# Patient Record
Sex: MALE | Race: WHITE | Employment: OTHER | URBAN - METROPOLITAN AREA
[De-identification: names, ages, dates, MRNs, and addresses within clinical notes are randomized per-mention and may not be internally consistent; named-entity substitution may affect disease eponyms.]

---

## 2017-03-28 ENCOUNTER — OFFICE VISIT (OUTPATIENT)
Dept: FAMILY MEDICINE CLINIC | Facility: CLINIC | Age: 37
End: 2017-03-28

## 2017-03-28 VITALS
RESPIRATION RATE: 18 BRPM | HEIGHT: 72 IN | WEIGHT: 196 LBS | DIASTOLIC BLOOD PRESSURE: 73 MMHG | HEART RATE: 72 BPM | SYSTOLIC BLOOD PRESSURE: 126 MMHG | BODY MASS INDEX: 26.55 KG/M2 | TEMPERATURE: 98 F

## 2017-03-28 DIAGNOSIS — Z00.00 ROUTINE PHYSICAL EXAMINATION: ICD-10-CM

## 2017-03-28 PROCEDURE — 99395 PREV VISIT EST AGE 18-39: CPT | Performed by: FAMILY MEDICINE

## 2017-03-28 NOTE — PROGRESS NOTES
HPI:    Patient ID: Nani Byrnes is a 39year old male. HPI Comments: Patient is here for routine physical exam. No acute issues. Patient is requesting blood testing. Diet and exercise have been good.  Past medical history, family history, and social histo Screening tests were discussed, and after discussion, will check lab work as below. Healthy diet, exercise, and weight were discussed. To call if problems and follow up and further management after testing. Routine follow up.  Pt will have his lab work sent

## 2017-03-30 ENCOUNTER — HOSPITAL ENCOUNTER (OUTPATIENT)
Dept: GENERAL RADIOLOGY | Age: 37
Discharge: HOME | End: 2017-03-30
Attending: NEUROLOGICAL SURGERY
Payer: COMMERCIAL

## 2017-03-30 ENCOUNTER — HOSPITAL ENCOUNTER (OUTPATIENT)
Dept: MRI IMAGING | Age: 37
Discharge: HOME | End: 2017-03-30
Attending: NEUROLOGICAL SURGERY
Payer: COMMERCIAL

## 2017-03-30 DIAGNOSIS — M47.22 CERVICAL SPONDYLOSIS WITH RADICULOPATHY: ICD-10-CM

## 2017-03-30 PROCEDURE — 72050 X-RAY EXAM NECK SPINE 4/5VWS: CPT

## 2017-03-30 PROCEDURE — 72141 MRI NECK SPINE W/O DYE: CPT

## 2017-04-04 ENCOUNTER — LAB ENCOUNTER (OUTPATIENT)
Dept: LAB | Age: 37
End: 2017-04-04
Attending: FAMILY MEDICINE
Payer: COMMERCIAL

## 2017-04-04 DIAGNOSIS — Z00.00 ROUTINE PHYSICAL EXAMINATION: ICD-10-CM

## 2017-04-04 PROCEDURE — 80061 LIPID PANEL: CPT

## 2017-04-04 PROCEDURE — 85025 COMPLETE CBC W/AUTO DIFF WBC: CPT

## 2017-04-04 PROCEDURE — 36415 COLL VENOUS BLD VENIPUNCTURE: CPT

## 2017-04-04 PROCEDURE — 80053 COMPREHEN METABOLIC PANEL: CPT

## 2017-06-13 ENCOUNTER — TELEPHONE (OUTPATIENT)
Dept: FAMILY MEDICINE CLINIC | Facility: CLINIC | Age: 37
End: 2017-06-13

## 2017-06-13 NOTE — TELEPHONE ENCOUNTER
Actions Requested: Advised ER for immediate diagnostic testing. Spouse agrees and will try to convince pt to go to [address] [place] [address] ER now.  Staff to f/u  Situation/Background   Problem: Sharp lower back pain   Onset: weeks ago   Associated Symptoms: Spouse calling for back pain and age > 61

## 2017-06-14 ENCOUNTER — OFFICE VISIT (OUTPATIENT)
Dept: FAMILY MEDICINE CLINIC | Facility: CLINIC | Age: 37
End: 2017-06-14

## 2017-06-14 VITALS
HEIGHT: 72 IN | RESPIRATION RATE: 18 BRPM | BODY MASS INDEX: 27.09 KG/M2 | WEIGHT: 200 LBS | TEMPERATURE: 98 F | HEART RATE: 68 BPM | DIASTOLIC BLOOD PRESSURE: 77 MMHG | SYSTOLIC BLOOD PRESSURE: 121 MMHG

## 2017-06-14 DIAGNOSIS — N20.0 KIDNEY STONE: ICD-10-CM

## 2017-06-14 PROCEDURE — 99212 OFFICE O/P EST SF 10 MIN: CPT | Performed by: FAMILY MEDICINE

## 2017-06-14 PROCEDURE — 99213 OFFICE O/P EST LOW 20 MIN: CPT | Performed by: FAMILY MEDICINE

## 2017-06-14 NOTE — PROGRESS NOTES
HPI:    Patient ID: Iris Blevins is a 39year old male. HPI Comments: Pt presents with a hx of kidney stones. Pt passed a kidney stone yesterday. Pt has no sig bleeding or pains now. Pt has had a lot of coffee recently. Pt denies any dysuria or fevers.

## 2017-11-13 ENCOUNTER — APPOINTMENT (OUTPATIENT)
Dept: CT IMAGING | Facility: HOSPITAL | Age: 37
End: 2017-11-13
Attending: EMERGENCY MEDICINE
Payer: COMMERCIAL

## 2017-11-13 ENCOUNTER — APPOINTMENT (OUTPATIENT)
Dept: GENERAL RADIOLOGY | Facility: HOSPITAL | Age: 37
End: 2017-11-13
Attending: UROLOGY
Payer: COMMERCIAL

## 2017-11-13 ENCOUNTER — HOSPITAL ENCOUNTER (OUTPATIENT)
Facility: HOSPITAL | Age: 37
Setting detail: OBSERVATION
Discharge: HOME | End: 2017-11-15
Attending: EMERGENCY MEDICINE | Admitting: HOSPITALIST
Payer: COMMERCIAL

## 2017-11-13 DIAGNOSIS — N20.0 KIDNEY STONE: Primary | ICD-10-CM

## 2017-11-13 PROCEDURE — 99220 INITIAL OBSERVATION CARE,LEVL III: CPT | Performed by: HOSPITALIST

## 2017-11-13 PROCEDURE — 99213 OFFICE O/P EST LOW 20 MIN: CPT | Performed by: UROLOGY

## 2017-11-13 PROCEDURE — 74000 XR ABDOMEN (1 VIEW) (CPT=74000): CPT | Performed by: UROLOGY

## 2017-11-13 PROCEDURE — 74176 CT ABD & PELVIS W/O CONTRAST: CPT | Performed by: EMERGENCY MEDICINE

## 2017-11-13 RX ORDER — HYDROMORPHONE HYDROCHLORIDE 1 MG/ML
1 INJECTION, SOLUTION INTRAMUSCULAR; INTRAVENOUS; SUBCUTANEOUS
Status: DISCONTINUED | OUTPATIENT
Start: 2017-11-13 | End: 2017-11-15

## 2017-11-13 RX ORDER — ONDANSETRON 2 MG/ML
4 INJECTION INTRAMUSCULAR; INTRAVENOUS ONCE
Status: COMPLETED | OUTPATIENT
Start: 2017-11-13 | End: 2017-11-13

## 2017-11-13 RX ORDER — DOCUSATE SODIUM 100 MG/1
100 CAPSULE, LIQUID FILLED ORAL 2 TIMES DAILY
Status: DISCONTINUED | OUTPATIENT
Start: 2017-11-13 | End: 2017-11-15

## 2017-11-13 RX ORDER — HYDROCODONE BITARTRATE AND ACETAMINOPHEN 10; 325 MG/1; MG/1
2 TABLET ORAL EVERY 6 HOURS PRN
Status: DISCONTINUED | OUTPATIENT
Start: 2017-11-13 | End: 2017-11-15

## 2017-11-13 RX ORDER — ALFUZOSIN HYDROCHLORIDE 10 MG/1
10 TABLET, EXTENDED RELEASE ORAL
Status: DISCONTINUED | OUTPATIENT
Start: 2017-11-14 | End: 2017-11-15

## 2017-11-13 RX ORDER — HYDROCODONE BITARTRATE AND ACETAMINOPHEN 5; 325 MG/1; MG/1
1 TABLET ORAL EVERY 4 HOURS PRN
Status: DISCONTINUED | OUTPATIENT
Start: 2017-11-13 | End: 2017-11-13

## 2017-11-13 RX ORDER — BISACODYL 10 MG
10 SUPPOSITORY, RECTAL RECTAL
Status: DISCONTINUED | OUTPATIENT
Start: 2017-11-13 | End: 2017-11-15

## 2017-11-13 RX ORDER — SODIUM CHLORIDE 0.9 % (FLUSH) 0.9 %
3 SYRINGE (ML) INJECTION AS NEEDED
Status: DISCONTINUED | OUTPATIENT
Start: 2017-11-13 | End: 2017-11-15

## 2017-11-13 RX ORDER — ONDANSETRON 2 MG/ML
4 INJECTION INTRAMUSCULAR; INTRAVENOUS EVERY 4 HOURS PRN
Status: DISCONTINUED | OUTPATIENT
Start: 2017-11-13 | End: 2017-11-15

## 2017-11-13 RX ORDER — HYDROCODONE BITARTRATE AND ACETAMINOPHEN 5; 325 MG/1; MG/1
1-2 TABLET ORAL EVERY 4 HOURS PRN
Qty: 10 TABLET | Refills: 0 | Status: SHIPPED | OUTPATIENT
Start: 2017-11-13 | End: 2017-11-14

## 2017-11-13 RX ORDER — HYDROCODONE BITARTRATE AND ACETAMINOPHEN 5; 325 MG/1; MG/1
2 TABLET ORAL EVERY 4 HOURS PRN
Status: DISCONTINUED | OUTPATIENT
Start: 2017-11-13 | End: 2017-11-15

## 2017-11-13 RX ORDER — ALFUZOSIN HYDROCHLORIDE 10 MG/1
10 TABLET, EXTENDED RELEASE ORAL ONCE
Status: COMPLETED | OUTPATIENT
Start: 2017-11-13 | End: 2017-11-13

## 2017-11-13 RX ORDER — MORPHINE SULFATE 4 MG/ML
4 INJECTION, SOLUTION INTRAMUSCULAR; INTRAVENOUS ONCE
Status: COMPLETED | OUTPATIENT
Start: 2017-11-13 | End: 2017-11-13

## 2017-11-13 RX ORDER — ACETAMINOPHEN 325 MG/1
650 TABLET ORAL EVERY 6 HOURS PRN
Status: DISCONTINUED | OUTPATIENT
Start: 2017-11-13 | End: 2017-11-15

## 2017-11-13 RX ORDER — SODIUM CHLORIDE 9 MG/ML
INJECTION, SOLUTION INTRAVENOUS CONTINUOUS
Status: DISCONTINUED | OUTPATIENT
Start: 2017-11-13 | End: 2017-11-14

## 2017-11-13 RX ORDER — ACETAMINOPHEN 325 MG/1
650 TABLET ORAL EVERY 4 HOURS PRN
Status: DISCONTINUED | OUTPATIENT
Start: 2017-11-13 | End: 2017-11-15

## 2017-11-13 RX ORDER — HYDROMORPHONE HYDROCHLORIDE 1 MG/ML
0.5 INJECTION, SOLUTION INTRAMUSCULAR; INTRAVENOUS; SUBCUTANEOUS EVERY 4 HOURS PRN
Status: DISCONTINUED | OUTPATIENT
Start: 2017-11-13 | End: 2017-11-15

## 2017-11-13 RX ORDER — KETOROLAC TROMETHAMINE 15 MG/ML
15 INJECTION, SOLUTION INTRAMUSCULAR; INTRAVENOUS ONCE
Status: COMPLETED | OUTPATIENT
Start: 2017-11-13 | End: 2017-11-13

## 2017-11-13 RX ORDER — MORPHINE SULFATE 4 MG/ML
INJECTION, SOLUTION INTRAMUSCULAR; INTRAVENOUS
Status: COMPLETED
Start: 2017-11-13 | End: 2017-11-13

## 2017-11-13 RX ORDER — ORPHENADRINE CITRATE 30 MG/ML
60 INJECTION INTRAMUSCULAR; INTRAVENOUS ONCE
Status: COMPLETED | OUTPATIENT
Start: 2017-11-13 | End: 2017-11-13

## 2017-11-13 RX ORDER — POLYETHYLENE GLYCOL 3350 17 G/17G
17 POWDER, FOR SOLUTION ORAL DAILY PRN
Status: DISCONTINUED | OUTPATIENT
Start: 2017-11-13 | End: 2017-11-15

## 2017-11-13 RX ORDER — HYDROMORPHONE HYDROCHLORIDE 1 MG/ML
0.5 INJECTION, SOLUTION INTRAMUSCULAR; INTRAVENOUS; SUBCUTANEOUS ONCE
Status: COMPLETED | OUTPATIENT
Start: 2017-11-13 | End: 2017-11-13

## 2017-11-13 RX ORDER — DIAZEPAM 5 MG/ML
2.5 INJECTION, SOLUTION INTRAMUSCULAR; INTRAVENOUS ONCE
Status: DISCONTINUED | OUTPATIENT
Start: 2017-11-13 | End: 2017-11-13

## 2017-11-13 NOTE — ED PROVIDER NOTES
Patient Seen in: [facility] AND [facility] Emergency Department    History   Patient presents with:  Abdomen/Flank Pain (GI/)    Stated Complaint:     HPI    Patient is a 79-year-old male with history of kidney stones who presents with left-sided flank pain st cyanosis/clubbing/edema  Neuro: CN intact, normal speech, normal gait, 5/5 motor strength in all extremities, no focal deficits  SKIN: warm, dry, no rashes      ED Course     Labs Reviewed   BASIC METABOLIC PANEL (8) - Abnormal; Notable for the following: f/u urine results and reassess patient after morphine. Disposition and Plan     Clinical Impression:  Kidney stone  (primary encounter diagnosis)    Disposition:  There is no disposition on file for this visit.   There is no disposition time on file

## 2017-11-13 NOTE — ED PROVIDER NOTES
The patient was signed out to me by Dr. Haydee Marie to follow-up on his urinalysis and reevaluate after pain medication. The patient does have a 4–5 mm proximal left ureteral stone, no signs of urine infection or elevated creatinine on labs.   Unfortunately

## 2017-11-13 NOTE — CONSULTS
[facility] HOSP - [facility]    Report of Consultation    Andres Lafleur Patient Status:  Observation    11/3/1980 MRN [id number]   Location [address] [facility] Attending Royer Bowman MD   Hosp Day # 0 PCP Beverly Flores MD     Date of Admission:   prostate cancer   • Other [OTHER] Other    • Dementia Paternal Grandmother      Alzheimers       Social History  Patient Guardian Status:  Not on file.     Other Topics            Concern  Caffeine Concern        Yes    Comment:Coffee, Soda    Social Histor normal. No rashes or lesions    Results:     Laboratory Data:    Lab Results  Component Value Date   WBC 9.5 11/13/2017   HGB 16.8 11/13/2017   HCT 49.9 11/13/2017    11/13/2017   CREATSERUM 1.32 11/13/2017   BUN 11 11/13/2017    11/13/2017 we can control his pain overnight with oral Norco and Dilaudid for breakthrough pain. I will reassess the patient in the morning to see how he feels and see what he is decided.   I spent well over 50 minutes with patient more than half the time in face to

## 2017-11-14 PROCEDURE — 99212 OFFICE O/P EST SF 10 MIN: CPT | Performed by: UROLOGY

## 2017-11-14 PROCEDURE — 99226 SUBSEQUENT OBSERVATION CARE: CPT | Performed by: HOSPITALIST

## 2017-11-14 RX ORDER — IBUPROFEN 400 MG/1
400 TABLET ORAL EVERY 8 HOURS PRN
Status: DISCONTINUED | OUTPATIENT
Start: 2017-11-14 | End: 2017-11-15

## 2017-11-14 RX ORDER — HYDROCODONE BITARTRATE AND ACETAMINOPHEN 10; 325 MG/1; MG/1
1 TABLET ORAL EVERY 4 HOURS PRN
Qty: 20 TABLET | Refills: 0 | Status: SHIPPED | OUTPATIENT
Start: 2017-11-14 | End: 2019-10-08

## 2017-11-14 RX ORDER — SULFAMETHOXAZOLE AND TRIMETHOPRIM 800; 160 MG/1; MG/1
1 TABLET ORAL EVERY 12 HOURS SCHEDULED
Status: DISCONTINUED | OUTPATIENT
Start: 2017-11-14 | End: 2017-11-15

## 2017-11-14 RX ORDER — MAGNESIUM SULFATE HEPTAHYDRATE 40 MG/ML
2 INJECTION, SOLUTION INTRAVENOUS ONCE
Status: COMPLETED | OUTPATIENT
Start: 2017-11-14 | End: 2017-11-14

## 2017-11-14 RX ORDER — ONDANSETRON 4 MG/1
4 TABLET, ORALLY DISINTEGRATING ORAL EVERY 4 HOURS PRN
Status: DISCONTINUED | OUTPATIENT
Start: 2017-11-14 | End: 2017-11-15

## 2017-11-14 RX ORDER — ECHINACEA PURPUREA EXTRACT 125 MG
1 TABLET ORAL EVERY 4 HOURS PRN
Status: DISCONTINUED | OUTPATIENT
Start: 2017-11-14 | End: 2017-11-15

## 2017-11-14 RX ORDER — ONDANSETRON 4 MG/1
4 TABLET, ORALLY DISINTEGRATING ORAL EVERY 4 HOURS PRN
Qty: 20 TABLET | Refills: 0 | Status: SHIPPED | OUTPATIENT
Start: 2017-11-14 | End: 2019-10-08

## 2017-11-14 RX ORDER — ALFUZOSIN HYDROCHLORIDE 10 MG/1
10 TABLET, EXTENDED RELEASE ORAL
Qty: 14 TABLET | Refills: 0 | Status: SHIPPED | OUTPATIENT
Start: 2017-11-15 | End: 2019-10-08

## 2017-11-14 NOTE — PROGRESS NOTES
Patient wife given prescriptions for tentative discharge. However, after urology came to see patient wants to keep one more night.  RN did inform patient and wife that if they want they can  the prescriptions today and will keep in nurse  for p

## 2017-11-14 NOTE — PAYOR COMM NOTE
OBSV STATUS    --------------  ADMISSION REVIEW     Payor: Andrew Elisa MASON/KAREN  Subscriber #:  [id number]  Authorization Number: N/A    Admit date: N/A  Admit time: N/A       Admitting Physician: Diana Oliveira MD  Attending Physician:  Jose Martin Owens MD All other systems reviewed and negative except as noted above.     Physical Exam[AM.1]   ED Triage Vitals [11/13/17 0455]  BP: (!) 168/91  Pulse: 79  Resp: 24  Temp: 97.8 °F (36.6 °C)  Temp src: Oral  SpO2: 98 %  O2 Device: None (Room air)[AM.2]    Curr 0615  ------------------------------------------------------------[AM.2]       [facility][AM.1]   CT abdomen and pelvis noncontrast      IMPRESSION:  3.5 mm calculus left upper ureter  Minimal proximal left hydroureter and hydronephrosis  No renal pelvis calculus Signed    :  Shelly Schroeder MD (Physician)       The patient was signed out to me by Dr. Momo Morrison to follow-up on his urinalysis and reevaluate after pain medication.   The patient does have a 4–5 mm proximal left ureteral stone, no signs of urine i represent renal failure.   -Repeat BMP in the morning  -IV fluids    DVT prophylaxis with SCDs  CODE STATUS is full code  Disposition pending    DATE OF ADMISSION: 11/13/17    CHIEF COMPLAINT: Flank pain    HISTORY OF PRESENT ILLNESS  This is a 44year oldm History   Problem Relation Age of Onset   • Dementia Maternal Grandfather      Alzheimers   • Prostate Cancer Maternal Grandfather    • Cancer Maternal Grandfather      prostate cancer   • Prostate Cancer Other      Family h/o prostate cancer   • Other [OT edema  Psych: Normal mood and affect.  Behavior and judgment normal.     Data:  Recent Labs   Lab  11/13/17   0502   RBC  5.72   HGB  16.8   HCT  49.9   MCV  87.2   MCH  29.4   MCHC  33.7   RDW  13.7   WBC  9.5   PLT  237     Recent Labs   Lab  11/13/17   0 chloride 40 mEq in sodium chloride 0.9 % 250 mL IVPB     Date Action Dose Route User    11/13/2017 4796 New Bag 40 mEq Intravenous Lon Ramírez RN      potassium chloride 40 mEq in sodium chloride 0.9 % 250 mL IVPB     Date Action Dose Route User

## 2017-11-14 NOTE — PROGRESS NOTES
[facility]  Hospitalist Progress  Note     Iris Felicity Patient Status:  Observation      44year old CSN [id number]   Location 536/536-A Attending Bryant Hensley MD   Hosp Day # 0 PCP Cammie Street MD     ASSESSMENT/PLAN    Radford Primrose CREATSERUM  1.32  1.50   GFRAA  >60  >60   GFRNAA  >60  52*   CA  8.8  8.1*   NA  138  135*   K  3.4  3.5  3.5   CL  102  104   CO2  28  26     No results for input(s): PT, INR, PTT in the last 72 hours.     • Alfuzosin HCl ER  10 mg Oral Daily with break

## 2017-11-14 NOTE — PLAN OF CARE
Problem: Patient/Family Goals  Goal: Patient/Family Long Term Goal  Patient's Long Term Goal: Resolution of kidney stone    Interventions:  - Urology on consult  - Straining all urine  - IVF  - See additional Care Plan goals for specific interventions    O nausea.   - Provide timely, complete, and accurate information to patient/family  - Incorporate patient and family knowledge, values, beliefs, and cultural backgrounds into the planning and delivery of care  - Encourage patient/family to participate in care

## 2017-11-14 NOTE — PLAN OF CARE
Problem: Patient/Family Goals  Goal: Patient/Family Long Term Goal  Patient's Long Term Goal: Resolution of kidney stone    Interventions:  - Urology on consult  - Straining all urine  - IVF  - See additional Care Plan goals for specific interventions   Ou care  Interventions:  - What would you like us to know as we care for you?  - Provide timely, complete, and accurate information to patient/family  - Incorporate patient and family knowledge, values, beliefs, and cultural backgrounds into the planning and

## 2017-11-15 ENCOUNTER — TELEPHONE (OUTPATIENT)
Dept: SURGERY | Facility: CLINIC | Age: 37
End: 2017-11-15

## 2017-11-15 VITALS
SYSTOLIC BLOOD PRESSURE: 153 MMHG | BODY MASS INDEX: 27.79 KG/M2 | HEIGHT: 72 IN | WEIGHT: 205.19 LBS | RESPIRATION RATE: 18 BRPM | OXYGEN SATURATION: 95 % | DIASTOLIC BLOOD PRESSURE: 88 MMHG | HEART RATE: 72 BPM | TEMPERATURE: 98 F

## 2017-11-15 PROCEDURE — 99212 OFFICE O/P EST SF 10 MIN: CPT | Performed by: UROLOGY

## 2017-11-15 PROCEDURE — 99217 OBSERVATION CARE DISCHARGE: CPT | Performed by: HOSPITALIST

## 2017-11-15 RX ORDER — SULFAMETHOXAZOLE AND TRIMETHOPRIM 800; 160 MG/1; MG/1
1 TABLET ORAL 2 TIMES DAILY
Qty: 14 TABLET | Refills: 0 | Status: SHIPPED | OUTPATIENT
Start: 2017-11-15 | End: 2017-11-22

## 2017-11-15 NOTE — TELEPHONE ENCOUNTER
Spoke with patient scheduled for L ESWL at /[place] on Wednesday 11/22/17 @ 7:30. Went over pre/op with patient and informed that I will mail out.

## 2017-11-15 NOTE — PLAN OF CARE
Problem: Patient/Family Goals  Goal: Patient/Family Long Term Goal  Patient's Long Term Goal: Resolution of kidney stone    Interventions:  - Urology on consult  - Straining all urine  - IVF  - See additional Care Plan goals for specific interventions    O to know as we care for you?   - Provide timely, complete, and accurate information to patient/family  - Incorporate patient and family knowledge, values, beliefs, and cultural backgrounds into the planning and delivery of care  - Encourage patient/family t

## 2017-11-15 NOTE — TELEPHONE ENCOUNTER
nAil Combs,  This pt was sent home today from the hospital.  I would like to schedule him for :    Diagnosis: Left ureteral stone  Procedure: ESWL with option for ureteroscopy  Site:[facility]  Timing: Next week.   -Antibiotics on call: Ciprofloxacin  mg IV o

## 2017-11-15 NOTE — DISCHARGE SUMMARY
[facility] HOSPITALIST  DISCHARGE SUMMARY     Negro Fam Patient Status:  Observation    11/3/1980 MRN [id number]   Location [facility] 5SW/SE Attending Ravindra Richardson MD   Hosp Day # 0 PCP Spencer Goodpasture, MD     Date of Admission: 2017 HYDROcodone-acetaminophen  MG Tabs  Commonly known as:  NORCO      Take 1 tablet by mouth every 4 (four) hours as needed.    Quantity:  20 tablet  Refills:  0     ondansetron 4 MG Tbdp  Commonly known as:  ZOFRAN-ODT      Take 1 tablet (4 mg total) edema.  -----------------------------------------------------------------------------------------------  PATIENT DISCHARGE INSTRUCTIONS: See electronic chart  Hospital Discharge Diagnoses: renal colic    TCM Diagnosis at discharge from Hospital: Other: dorene

## 2017-11-15 NOTE — PROGRESS NOTES
[facility]    Progress Note    Wilman Paul Patient Status:  Observation    11/3/1980 MRN [id number]   Location [facility] 5SW/SE Attending Marquise Santamaria MD   Hosp Day # 0 PCP Ayan Shaw MD       Subjective:   Wilman Paul is a(n

## 2017-11-15 NOTE — PROGRESS NOTES
[facility]    Progress Note    Saultyron Douglas Patient Status:  Observation    11/3/1980 MRN [id number]   Location [facility] 5SW/SE Attending Juhi Barnes MD   Hosp Day # 0 PCP Rowan Dixon MD       Subjective:   Lori Douglas is a(n perinephric and proximal periureteric fat stranding, which may relate to obstructive uropathy, but correlation with urinalysis is recommended to exclude superimposed infection of the obstructed collecting system. 3. No additional nephroureterolithiasis.

## 2017-11-16 ENCOUNTER — TELEPHONE (OUTPATIENT)
Dept: SURGERY | Facility: CLINIC | Age: 37
End: 2017-11-16

## 2017-11-16 ENCOUNTER — TELEPHONE (OUTPATIENT)
Dept: INTERNAL MEDICINE UNIT | Facility: HOSPITAL | Age: 37
End: 2017-11-16

## 2017-11-16 ENCOUNTER — HOSPITAL ENCOUNTER (EMERGENCY)
Facility: HOSPITAL | Age: 37
Discharge: HOME | End: 2017-11-16
Attending: EMERGENCY MEDICINE
Payer: COMMERCIAL

## 2017-11-16 VITALS
DIASTOLIC BLOOD PRESSURE: 85 MMHG | SYSTOLIC BLOOD PRESSURE: 135 MMHG | WEIGHT: 150 LBS | TEMPERATURE: 98 F | BODY MASS INDEX: 20.32 KG/M2 | HEIGHT: 72 IN | HEART RATE: 62 BPM | RESPIRATION RATE: 18 BRPM | OXYGEN SATURATION: 100 %

## 2017-11-16 DIAGNOSIS — K12.2 UVULITIS: Primary | ICD-10-CM

## 2017-11-16 PROCEDURE — 99283 EMERGENCY DEPT VISIT LOW MDM: CPT

## 2017-11-16 RX ORDER — AZITHROMYCIN 250 MG/1
TABLET, FILM COATED ORAL
Qty: 1 PACKAGE | Refills: 0 | Status: SHIPPED | OUTPATIENT
Start: 2017-11-16 | End: 2017-11-21

## 2017-11-16 RX ORDER — DEXAMETHASONE SODIUM PHOSPHATE 4 MG/ML
10 VIAL (ML) INJECTION ONCE
Status: COMPLETED | OUTPATIENT
Start: 2017-11-16 | End: 2017-11-16

## 2017-11-16 RX ORDER — PREDNISONE 20 MG/1
40 TABLET ORAL DAILY
Qty: 8 TABLET | Refills: 0 | Status: SHIPPED | OUTPATIENT
Start: 2017-11-16 | End: 2017-11-20

## 2017-11-16 NOTE — ED NOTES
STARTED TAKING BACTRIM YESTERDAY 2 DOSES FOR A KIDNEY STONE AND LAST NIGHT WOKE UP WITH SWOLLEN THROAT. + EDEMA, REDNESS, NO ACUTE SOB

## 2017-11-16 NOTE — TELEPHONE ENCOUNTER
pt called, throat swelling, feels like allergic reaction,   Was seen in [facility] ED on Monday 11/13/17 for kidney stones.

## 2017-11-16 NOTE — ED INITIAL ASSESSMENT (HPI)
Pt c/o throat swelling and itchiness since last night. Pt is concerned that he is having an allergic rxn to bactrim that he was given here for kidney stone and uti.

## 2017-11-16 NOTE — TELEPHONE ENCOUNTER
See below. Was asked by Chayito Patton to speak with pt's wife. Spoke with wife. She states pt's throat is really swollen, and she looked inside, and \"  it is so swollen, it looks like it is closing. \" Asked if he is having difficulty breathing.  She states \" a

## 2017-11-17 ENCOUNTER — TELEPHONE (OUTPATIENT)
Dept: SURGERY | Facility: CLINIC | Age: 37
End: 2017-11-17

## 2017-11-17 NOTE — TELEPHONE ENCOUNTER
Pt is passing stone, pain is at 7 - he took norco a couple of hours ago - asking if he can take another dose now

## 2017-11-17 NOTE — TELEPHONE ENCOUNTER
I spoke with pt's spouse Chayito Hwang and determined that pt took a Norco 10/325mg ( prescribed as 1 tab every 4 hrs),  about 2 hrs ago and he was doubled over with persistent severe pain and had no relief from 1 tab so she said that she just gave him another ta

## 2017-11-17 NOTE — ED PROVIDER NOTES
Patient Seen in: [facility] AND [facility] Emergency Department    History   Patient presents with: Allergic Rxn Allergies (immune)    Stated Complaint: throat swelling    HPI    Patient here with sore throat and swelling in uvula for 1 days.  On bactrim for uri Quit date: 11/13/2000  Smokeless tobacco: Never Used                      Alcohol use: No                Review of Systems    Positive for stated complaint: throat swelling  Other systems are as noted in HPI. Constitutional and vital signs reviewed.

## 2017-11-22 ENCOUNTER — APPOINTMENT (OUTPATIENT)
Dept: LAB | Facility: HOSPITAL | Age: 37
End: 2017-11-22
Attending: UROLOGY
Payer: COMMERCIAL

## 2017-11-22 ENCOUNTER — TELEPHONE (OUTPATIENT)
Dept: SURGERY | Facility: CLINIC | Age: 37
End: 2017-11-22

## 2017-11-22 DIAGNOSIS — N20.0 KIDNEY STONES: Primary | ICD-10-CM

## 2017-11-22 PROCEDURE — 88300 SURGICAL PATH GROSS: CPT

## 2017-11-22 PROCEDURE — 82365 CALCULUS SPECTROSCOPY: CPT | Performed by: UROLOGY

## 2017-11-22 PROCEDURE — 88300 SURGICAL PATH GROSS: CPT | Performed by: UROLOGY

## 2019-10-07 ENCOUNTER — NURSE TRIAGE (OUTPATIENT)
Dept: FAMILY MEDICINE CLINIC | Facility: CLINIC | Age: 39
End: 2019-10-07

## 2019-10-07 NOTE — TELEPHONE ENCOUNTER
Wife states for the past couple of weeks patient has had high blood pressure readings. This morning blood pressure was 148/96      Transferring to triage.

## 2019-10-07 NOTE — TELEPHONE ENCOUNTER
Reason for Disposition  • Patient wants to be seen    Protocols used: HIGH BLOOD PRESSURE-A-OH    Action Requested: Summary for Provider     []  Critical Lab, Recommendations Needed  [] Need Additional Advice  [x]   FYI    []   Need Orders  [] Need Medic

## 2019-10-08 ENCOUNTER — OFFICE VISIT (OUTPATIENT)
Dept: FAMILY MEDICINE CLINIC | Facility: CLINIC | Age: 39
End: 2019-10-08
Payer: COMMERCIAL

## 2019-10-08 VITALS
RESPIRATION RATE: 18 BRPM | SYSTOLIC BLOOD PRESSURE: 137 MMHG | BODY MASS INDEX: 26.41 KG/M2 | WEIGHT: 195 LBS | DIASTOLIC BLOOD PRESSURE: 81 MMHG | HEART RATE: 74 BPM | HEIGHT: 72 IN | TEMPERATURE: 98 F

## 2019-10-08 DIAGNOSIS — R03.0 ELEVATED BLOOD PRESSURE READING: ICD-10-CM

## 2019-10-08 PROCEDURE — 99213 OFFICE O/P EST LOW 20 MIN: CPT | Performed by: FAMILY MEDICINE

## 2019-10-08 RX ORDER — LISINOPRIL 10 MG/1
10 TABLET ORAL DAILY
Qty: 30 TABLET | Refills: 2 | Status: SHIPPED | OUTPATIENT
Start: 2019-10-08 | End: 2020-06-03

## 2019-10-08 NOTE — PROGRESS NOTES
HPI:    Patient ID: America Youssef is a 43year old male. Pt is here to discuss his elevated blood pressure over the last several months. Pt has been monitoring his own blood pressure at home and has had some readings over 140/90 at times.   Pt is on treatme Discussed good diet/activity; Follow up as needed or in 1 month if medication is started. No orders of the defined types were placed in this encounter.       Meds This Visit:  Requested Prescriptions     Signed Prescriptions Disp Refills   • lisinopr

## 2020-06-03 ENCOUNTER — OFFICE VISIT (OUTPATIENT)
Dept: FAMILY MEDICINE CLINIC | Facility: CLINIC | Age: 40
End: 2020-06-03
Payer: COMMERCIAL

## 2020-06-03 VITALS
WEIGHT: 198 LBS | BODY MASS INDEX: 26.82 KG/M2 | RESPIRATION RATE: 16 BRPM | HEIGHT: 72 IN | DIASTOLIC BLOOD PRESSURE: 88 MMHG | TEMPERATURE: 96 F | SYSTOLIC BLOOD PRESSURE: 140 MMHG | HEART RATE: 71 BPM

## 2020-06-03 DIAGNOSIS — I10 ESSENTIAL HYPERTENSION: ICD-10-CM

## 2020-06-03 DIAGNOSIS — G47.30 SLEEP APNEA, UNSPECIFIED TYPE: ICD-10-CM

## 2020-06-03 PROCEDURE — 99213 OFFICE O/P EST LOW 20 MIN: CPT | Performed by: FAMILY MEDICINE

## 2020-06-03 RX ORDER — LISINOPRIL 10 MG/1
10 TABLET ORAL DAILY
Qty: 30 TABLET | Refills: 5 | Status: SHIPPED | OUTPATIENT
Start: 2020-06-03 | End: 2021-02-08

## 2020-06-03 NOTE — PROGRESS NOTES
HPI:    Patient ID: Rina Guerrero is a 35year old male. Pt presents with some recent apnea and snoring issues as per wife. Pt has hx of hypertension- borderline. Has not been taking his mediation. No chest pains or sig headaches.  Pt does exercise regular

## 2020-09-29 ENCOUNTER — IMMUNIZATION (OUTPATIENT)
Dept: PEDIATRICS CLINIC | Facility: CLINIC | Age: 40
End: 2020-09-29
Payer: COMMERCIAL

## 2020-09-29 DIAGNOSIS — Z23 NEED FOR VACCINATION: ICD-10-CM

## 2020-09-29 PROCEDURE — 90471 IMMUNIZATION ADMIN: CPT | Performed by: PEDIATRICS

## 2020-09-29 PROCEDURE — 90686 IIV4 VACC NO PRSV 0.5 ML IM: CPT | Performed by: PEDIATRICS

## 2021-01-12 ENCOUNTER — OFFICE VISIT (OUTPATIENT)
Dept: SURGERY | Facility: CLINIC | Age: 41
End: 2021-01-12
Payer: COMMERCIAL

## 2021-01-12 VITALS
HEIGHT: 72 IN | DIASTOLIC BLOOD PRESSURE: 74 MMHG | RESPIRATION RATE: 16 BRPM | SYSTOLIC BLOOD PRESSURE: 140 MMHG | BODY MASS INDEX: 26.01 KG/M2 | WEIGHT: 192 LBS | HEART RATE: 82 BPM

## 2021-01-12 DIAGNOSIS — N40.1 BENIGN PROSTATIC HYPERPLASIA WITH LOWER URINARY TRACT SYMPTOMS, SYMPTOM DETAILS UNSPECIFIED: Primary | ICD-10-CM

## 2021-01-12 PROCEDURE — 99244 OFF/OP CNSLTJ NEW/EST MOD 40: CPT | Performed by: UROLOGY

## 2021-01-12 PROCEDURE — 3077F SYST BP >= 140 MM HG: CPT | Performed by: UROLOGY

## 2021-01-12 PROCEDURE — 3008F BODY MASS INDEX DOCD: CPT | Performed by: UROLOGY

## 2021-01-12 PROCEDURE — 3078F DIAST BP <80 MM HG: CPT | Performed by: UROLOGY

## 2021-01-12 RX ORDER — FINASTERIDE 5 MG/1
5 TABLET, FILM COATED ORAL DAILY
COMMUNITY
End: 2021-01-12 | Stop reason: CLARIF

## 2021-01-12 NOTE — PROGRESS NOTES
SUBJECTIVE:  Massiel Ventura is a 38year old male who presents for a consultation at the request of, and a copy of this note will be sent to, Dr. Nathalie Lindo, for evaluation of  benign prostatic hyperplasia. He states that the problem is unchanged.  Symptoms Seborrheic dermatitis       Past Surgical History:   Procedure Laterality Date   • EYE SURGERY     • LASIK  2012   • Mortimer Danes AUGMENT  2015   • REPAIR OF NASAL SEPTUM  2008   • SKIN SURGERY  07/18/14    R inguinal spindle cell lipoma   • SKIN SURGERY tenderness, masses, organomegaly or guarding  GENITOURINARY:      Penis: no penile lesions or discharge. Meatus normal location and size.       Scrotum: normal in appearance      Right Epididymis and Vas: both are palpably normal.      Right Testis: normal

## 2021-02-08 RX ORDER — LISINOPRIL 10 MG/1
TABLET ORAL
Qty: 30 TABLET | Refills: 5 | Status: SHIPPED | OUTPATIENT
Start: 2021-02-08 | End: 2021-08-26

## 2021-02-08 NOTE — TELEPHONE ENCOUNTER
Message noted: Chart reviewed and may refill medication as requested times one with 5 additional refills. Prescription sent to listed pharmacy. Pharmacy to notify patient.  Pt notified through [facility]

## 2021-08-26 RX ORDER — LISINOPRIL 10 MG/1
10 TABLET ORAL DAILY
Qty: 90 TABLET | Refills: 0 | Status: SHIPPED | OUTPATIENT
Start: 2021-08-26

## 2021-08-26 NOTE — TELEPHONE ENCOUNTER
Message noted: Chart reviewed and may refill medication as requested times one. Prescription sent to listed pharmacy. Pharmacy to notify patient to make appointment for further refills  Pt notified through [facility] also.

## 2021-08-26 NOTE — TELEPHONE ENCOUNTER
Please Review.  Protocol failed / No protocol     Requested Prescriptions   Pending Prescriptions Disp Refills    LISINOPRIL 10 MG Oral Tab [Pharmacy Med Name: LISINOPRIL 10MG TABLETS] 30 tablet 5     Sig: TAKE 1 TABLET(10 MG) BY MOUTH DAILY        Hyperten

## 2022-09-29 ENCOUNTER — NURSE TRIAGE (OUTPATIENT)
Dept: FAMILY MEDICINE CLINIC | Facility: CLINIC | Age: 42
End: 2022-09-29

## 2023-12-13 ENCOUNTER — HOSPITAL ENCOUNTER (OUTPATIENT)
Dept: GENERAL RADIOLOGY | Age: 43
Discharge: HOME | End: 2023-12-13
Attending: NURSE PRACTITIONER
Payer: COMMERCIAL

## 2023-12-13 ENCOUNTER — OFFICE VISIT (OUTPATIENT)
Dept: FAMILY MEDICINE CLINIC | Facility: CLINIC | Age: 43
End: 2023-12-13
Payer: COMMERCIAL

## 2023-12-13 VITALS
SYSTOLIC BLOOD PRESSURE: 134 MMHG | OXYGEN SATURATION: 97 % | HEIGHT: 72 IN | TEMPERATURE: 98 F | WEIGHT: 200 LBS | RESPIRATION RATE: 18 BRPM | BODY MASS INDEX: 27.09 KG/M2 | HEART RATE: 70 BPM | DIASTOLIC BLOOD PRESSURE: 80 MMHG

## 2023-12-13 DIAGNOSIS — R05.9 COUGH, UNSPECIFIED TYPE: ICD-10-CM

## 2023-12-13 DIAGNOSIS — J40 BRONCHITIS: Primary | ICD-10-CM

## 2023-12-13 DIAGNOSIS — J40 BRONCHITIS: ICD-10-CM

## 2023-12-13 PROCEDURE — 71046 X-RAY EXAM CHEST 2 VIEWS: CPT | Performed by: NURSE PRACTITIONER

## 2023-12-13 PROCEDURE — 99204 OFFICE O/P NEW MOD 45 MIN: CPT | Performed by: NURSE PRACTITIONER

## 2023-12-13 PROCEDURE — 3079F DIAST BP 80-89 MM HG: CPT | Performed by: NURSE PRACTITIONER

## 2023-12-13 PROCEDURE — 3075F SYST BP GE 130 - 139MM HG: CPT | Performed by: NURSE PRACTITIONER

## 2023-12-13 PROCEDURE — 3008F BODY MASS INDEX DOCD: CPT | Performed by: NURSE PRACTITIONER

## 2023-12-13 PROCEDURE — 87637 SARSCOV2&INF A&B&RSV AMP PRB: CPT | Performed by: NURSE PRACTITIONER

## 2023-12-13 RX ORDER — BENZONATATE 200 MG/1
200 CAPSULE ORAL 3 TIMES DAILY PRN
Qty: 15 CAPSULE | Refills: 0 | Status: SHIPPED | OUTPATIENT
Start: 2023-12-13 | End: 2023-12-18

## 2023-12-13 RX ORDER — PREDNISONE 20 MG/1
40 TABLET ORAL DAILY
Qty: 10 TABLET | Refills: 0 | Status: SHIPPED | OUTPATIENT
Start: 2023-12-13 | End: 2023-12-18

## 2023-12-13 RX ORDER — ALBUTEROL SULFATE 90 UG/1
2 AEROSOL, METERED RESPIRATORY (INHALATION) EVERY 4 HOURS PRN
Qty: 1 EACH | Refills: 0 | Status: SHIPPED | OUTPATIENT
Start: 2023-12-13

## 2023-12-14 LAB
FLUAV + FLUBV RNA SPEC NAA+PROBE: NOT DETECTED
FLUAV + FLUBV RNA SPEC NAA+PROBE: NOT DETECTED
RSV RNA SPEC NAA+PROBE: NOT DETECTED
SARS-COV-2 RNA RESP QL NAA+PROBE: NOT DETECTED

## 2023-12-19 ENCOUNTER — HOSPITAL ENCOUNTER (OUTPATIENT)
Age: 43
Discharge: HOME | End: 2023-12-19
Payer: COMMERCIAL

## 2023-12-19 ENCOUNTER — OFFICE VISIT (OUTPATIENT)
Dept: FAMILY MEDICINE CLINIC | Facility: CLINIC | Age: 43
End: 2023-12-19
Payer: COMMERCIAL

## 2023-12-19 VITALS
TEMPERATURE: 98 F | HEIGHT: 72 IN | HEART RATE: 76 BPM | BODY MASS INDEX: 28.85 KG/M2 | WEIGHT: 213 LBS | DIASTOLIC BLOOD PRESSURE: 88 MMHG | SYSTOLIC BLOOD PRESSURE: 136 MMHG | RESPIRATION RATE: 18 BRPM | OXYGEN SATURATION: 97 %

## 2023-12-19 VITALS
RESPIRATION RATE: 18 BRPM | SYSTOLIC BLOOD PRESSURE: 145 MMHG | TEMPERATURE: 99 F | DIASTOLIC BLOOD PRESSURE: 92 MMHG | OXYGEN SATURATION: 96 % | HEART RATE: 73 BPM

## 2023-12-19 DIAGNOSIS — R05.8 PAINFUL COUGH: ICD-10-CM

## 2023-12-19 DIAGNOSIS — R52 PAINFUL COUGH: ICD-10-CM

## 2023-12-19 DIAGNOSIS — J06.9 UPPER RESPIRATORY TRACT INFECTION, UNSPECIFIED TYPE: Primary | ICD-10-CM

## 2023-12-19 DIAGNOSIS — J01.90 ACUTE SINUSITIS, RECURRENCE NOT SPECIFIED, UNSPECIFIED LOCATION: ICD-10-CM

## 2023-12-19 DIAGNOSIS — R05.2 SUBACUTE COUGH: Primary | ICD-10-CM

## 2023-12-19 LAB
#MXD IC: 1 X10ˆ3/UL (ref 0.1–1)
ATRIAL RATE: 67 BPM
BUN BLD-MCNC: 9 MG/DL (ref 7–18)
CHLORIDE BLD-SCNC: 99 MMOL/L (ref 98–112)
CO2 BLD-SCNC: 28 MMOL/L (ref 21–32)
CREAT BLD-MCNC: 1.3 MG/DL
DDIMER WHOLE BLOOD: <200 NG/ML DDU (ref ?–400)
EGFRCR SERPLBLD CKD-EPI 2021: 69 ML/MIN/1.73M2 (ref 60–?)
GLUCOSE BLD-MCNC: 89 MG/DL (ref 70–99)
HCT VFR BLD AUTO: 49.7 %
HCT VFR BLD CALC: 54 %
HGB BLD-MCNC: 16.1 G/DL
ISTAT IONIZED CALCIUM FOR CHEM 8: 1.17 MMOL/L (ref 1.12–1.32)
LYMPHOCYTES # BLD AUTO: 2.3 X10ˆ3/UL (ref 1–4)
LYMPHOCYTES NFR BLD AUTO: 26 %
MCH RBC QN AUTO: 27.3 PG (ref 26–34)
MCHC RBC AUTO-ENTMCNC: 32.4 G/DL (ref 31–37)
MCV RBC AUTO: 84.2 FL (ref 80–100)
MIXED CELL %: 11.1 %
NEUTROPHILS # BLD AUTO: 5.7 X10ˆ3/UL (ref 1.5–7.7)
NEUTROPHILS NFR BLD AUTO: 62.9 %
P AXIS: 56 DEGREES
P-R INTERVAL: 148 MS
PLATELET # BLD AUTO: 301 X10ˆ3/UL (ref 150–450)
POTASSIUM BLD-SCNC: 3.8 MMOL/L (ref 3.6–5.1)
Q-T INTERVAL: 396 MS
QRS DURATION: 120 MS
QTC CALCULATION (BEZET): 418 MS
R AXIS: -62 DEGREES
RBC # BLD AUTO: 5.9 X10ˆ6/UL
SODIUM BLD-SCNC: 141 MMOL/L (ref 136–145)
T AXIS: 48 DEGREES
TROPONIN I BLD-MCNC: <0.02 NG/ML
VENTRICULAR RATE: 67 BPM
WBC # BLD AUTO: 9 X10ˆ3/UL (ref 4–11)

## 2023-12-19 PROCEDURE — 99214 OFFICE O/P EST MOD 30 MIN: CPT

## 2023-12-19 PROCEDURE — 93010 ELECTROCARDIOGRAM REPORT: CPT | Performed by: INTERNAL MEDICINE

## 2023-12-19 PROCEDURE — 36415 COLL VENOUS BLD VENIPUNCTURE: CPT

## 2023-12-19 PROCEDURE — 85378 FIBRIN DEGRADE SEMIQUANT: CPT | Performed by: NURSE PRACTITIONER

## 2023-12-19 PROCEDURE — 93010 ELECTROCARDIOGRAM REPORT: CPT

## 2023-12-19 PROCEDURE — 3075F SYST BP GE 130 - 139MM HG: CPT | Performed by: NURSE PRACTITIONER

## 2023-12-19 PROCEDURE — 85025 COMPLETE CBC W/AUTO DIFF WBC: CPT | Performed by: NURSE PRACTITIONER

## 2023-12-19 PROCEDURE — 99214 OFFICE O/P EST MOD 30 MIN: CPT | Performed by: NURSE PRACTITIONER

## 2023-12-19 PROCEDURE — 84484 ASSAY OF TROPONIN QUANT: CPT

## 2023-12-19 PROCEDURE — 3008F BODY MASS INDEX DOCD: CPT | Performed by: NURSE PRACTITIONER

## 2023-12-19 PROCEDURE — 80047 BASIC METABLC PNL IONIZED CA: CPT

## 2023-12-19 PROCEDURE — 3079F DIAST BP 80-89 MM HG: CPT | Performed by: NURSE PRACTITIONER

## 2023-12-19 PROCEDURE — 99204 OFFICE O/P NEW MOD 45 MIN: CPT

## 2023-12-19 PROCEDURE — 93005 ELECTROCARDIOGRAM TRACING: CPT

## 2023-12-19 RX ORDER — TADALAFIL 5 MG/1
1 TABLET ORAL DAILY
COMMUNITY

## 2023-12-19 RX ORDER — PREDNISONE 20 MG/1
40 TABLET ORAL DAILY
Qty: 14 TABLET | Refills: 0 | Status: SHIPPED | OUTPATIENT
Start: 2023-12-19 | End: 2023-12-26

## 2023-12-19 RX ORDER — DOXYCYCLINE HYCLATE 100 MG/1
100 CAPSULE ORAL 2 TIMES DAILY
Qty: 14 CAPSULE | Refills: 0 | Status: SHIPPED | OUTPATIENT
Start: 2023-12-19 | End: 2023-12-26

## 2023-12-19 NOTE — ED INITIAL ASSESSMENT (HPI)
Patient arrives ambulatory with c/o cough x 6 weeks. Reports being seen last week for cough and given steroids and has completed them. Denies any improvement of symptoms. Reports worsening cough x 2 days along with chest pain, shortness of breath and wheezing.

## 2024-02-14 ENCOUNTER — OFFICE VISIT (OUTPATIENT)
Dept: FAMILY MEDICINE CLINIC | Facility: CLINIC | Age: 44
End: 2024-02-14
Payer: COMMERCIAL

## 2024-02-14 VITALS
SYSTOLIC BLOOD PRESSURE: 118 MMHG | BODY MASS INDEX: 28.31 KG/M2 | HEART RATE: 85 BPM | HEIGHT: 72 IN | TEMPERATURE: 98 F | WEIGHT: 209 LBS | RESPIRATION RATE: 20 BRPM | DIASTOLIC BLOOD PRESSURE: 70 MMHG

## 2024-02-14 DIAGNOSIS — J01.90 ACUTE SINUSITIS, RECURRENCE NOT SPECIFIED, UNSPECIFIED LOCATION: Primary | ICD-10-CM

## 2024-02-14 DIAGNOSIS — N39.9 URINARY DISORDER: ICD-10-CM

## 2024-02-14 PROCEDURE — 3078F DIAST BP <80 MM HG: CPT | Performed by: FAMILY MEDICINE

## 2024-02-14 PROCEDURE — 3074F SYST BP LT 130 MM HG: CPT | Performed by: FAMILY MEDICINE

## 2024-02-14 PROCEDURE — 3008F BODY MASS INDEX DOCD: CPT | Performed by: FAMILY MEDICINE

## 2024-02-14 PROCEDURE — 99213 OFFICE O/P EST LOW 20 MIN: CPT | Performed by: FAMILY MEDICINE

## 2024-02-14 RX ORDER — TRETINOIN 0.5 MG/G
CREAM TOPICAL
COMMUNITY
Start: 2024-01-12

## 2024-02-14 RX ORDER — KETOCONAZOLE 20 MG/ML
SHAMPOO TOPICAL
COMMUNITY
Start: 2023-08-25

## 2024-02-14 RX ORDER — KETOCONAZOLE 20 MG/G
CREAM TOPICAL
COMMUNITY
Start: 2023-08-24

## 2024-02-14 RX ORDER — SULFACETAMIDE SODIUM AND SULFUR 10; 5 MG/G; MG/G
RINSE TOPICAL
COMMUNITY
Start: 2023-11-12

## 2024-02-14 RX ORDER — IVERMECTIN 10 MG/G
1 CREAM TOPICAL DAILY
COMMUNITY
Start: 2024-01-13

## 2024-02-14 RX ORDER — AZITHROMYCIN 250 MG/1
TABLET, FILM COATED ORAL
Qty: 6 TABLET | Refills: 0 | Status: SHIPPED | OUTPATIENT
Start: 2024-02-14 | End: 2024-02-19

## 2024-02-14 RX ORDER — DOXYCYCLINE HYCLATE 100 MG/1
1 CAPSULE ORAL DAILY
COMMUNITY
Start: 2021-08-12

## 2024-02-14 RX ORDER — FLUTICASONE PROPIONATE 50 MCG
1 SPRAY, SUSPENSION (ML) NASAL DAILY
Qty: 1 EACH | Refills: 2 | Status: SHIPPED | OUTPATIENT
Start: 2024-02-14

## 2024-02-14 RX ORDER — CYCLOBENZAPRINE HCL 10 MG
1 TABLET ORAL NIGHTLY
COMMUNITY
Start: 2022-01-21

## 2024-02-14 RX ORDER — SODIUM SULFACETAMIDE AND SULFUR 100; 50 MG/G; MG/G
CREAM TOPICAL
COMMUNITY
Start: 2023-11-13

## 2024-02-14 NOTE — PROGRESS NOTES
Subjective:   Patient ID: Pio Rust is a 43 year old male.    Pt presents with hx of sinus issues and URI symptoms over the last several months. Has had multiple visits and had negative respiratory panel at urgent care. Was treated with steroids and round of abx but has had persistent sinus symptoms and rhinorrhea. No fevers or cough. No SOB.  Pt has tried otc remedies without relief.   Pt also has had some urinary issues. Pt is on finasteride. No fevers or pains but some flow issues.        History/Other:   Review of Systems   Constitutional:  Negative for fever.   HENT:  Positive for congestion and rhinorrhea.    Respiratory:  Negative for cough and shortness of breath.    Genitourinary:  Positive for frequency. Negative for dysuria and hematuria.     Current Outpatient Medications   Medication Sig Dispense Refill    SOOLANTRA 1 % External Cream Apply 1 Application topically daily.      ketoconazole 2 % External Cream APPLY A SMALL AMOUNT TO AFFECTED IMMEDIATE SURROUNDING AREA AT BEDTIME AS DIRECTED      ketoconazole 2 % External Shampoo       Sulfacetamide Sodium-Sulfur 10-5 % External Liquid APPLY SMALL AMOUNT OF CLEANSER TOPICALLY ONCE OR TWICE DAILY FOR SKIN CLEANSING.      Sulfacetamide Sodium-Sulfur 10-5 % External Cream APPLY THICK FILM TOPICALLY OVERNIGHT TO LARGE LESION AND WASH OFF IN THE MORNING AS DIRECTED      Tretinoin 0.05 % External Cream APPLY PEA SIZED AMOUNT IN A THIN LAYER EVENLY TO SKIN ON ENTIRE FACE THREE TIMES PER WEEK      cyclobenzaprine 10 MG Oral Tab Take 1 tablet (10 mg total) by mouth nightly.      doxycycline 100 MG Oral Cap Take 1 capsule (100 mg total) by mouth daily.      azithromycin (ZITHROMAX Z-JOSE) 250 MG Oral Tab Take 2 tablets (500 mg total) by mouth daily for 1 day, THEN 1 tablet (250 mg total) daily for 4 days. 6 tablet 0    fluticasone propionate 50 MCG/ACT Nasal Suspension 1 spray by Nasal route daily. One spray per each nostril daily. 1 each 2    tadalafil 5 MG Oral  Tab Take 1 tablet (5 mg total) by mouth daily.      albuterol 108 (90 Base) MCG/ACT Inhalation Aero Soln Inhale 2 puffs into the lungs every 4 (four) hours as needed for Wheezing. 1 each 0    lisinopril 10 MG Oral Tab Take 1 tablet (10 mg total) by mouth daily. 90 tablet 0    finasteride 1 mg/mL Oral Solution Take 5 mL (5 mg total) by mouth daily.       Allergies:  Allergies   Allergen Reactions    Penicillins UNKNOWN and OTHER (SEE COMMENTS)     CHILDHOOD  Reaction unknown to patient; occurred during childhood    Reaction unknown to patient; occurred during childhood       Objective:   Physical Exam  Vitals reviewed.   Constitutional:       Appearance: Normal appearance. He is well-developed.   HENT:      Right Ear: Tympanic membrane and ear canal normal.      Left Ear: Tympanic membrane and ear canal normal.      Mouth/Throat:      Mouth: Mucous membranes are moist.      Pharynx: No oropharyngeal exudate or posterior oropharyngeal erythema.   Cardiovascular:      Rate and Rhythm: Normal rate and regular rhythm.      Heart sounds: Normal heart sounds.   Pulmonary:      Effort: Pulmonary effort is normal. No respiratory distress.      Breath sounds: Normal breath sounds. No wheezing or rales.   Musculoskeletal:      Cervical back: Normal range of motion and neck supple.   Lymphadenopathy:      Cervical: No cervical adenopathy.   Neurological:      Mental Status: He is alert.         Assessment & Plan:   1. Acute sinusitis, recurrence not specified, unspecified location:  - After discussion with patient, flonase and zithromax as directed; Over the counter remedies discussed; To call if worse or not better; Follow up in one week if not resolved or as needed if worse.     2. Urinary disorder:  - After discussion, will send to urology for further evaluation and treatment; To call if any significant symptoms.          No orders of the defined types were placed in this encounter.      Meds This Visit:  Requested  Prescriptions     Signed Prescriptions Disp Refills    azithromycin (ZITHROMAX Z-JOSE) 250 MG Oral Tab 6 tablet 0     Sig: Take 2 tablets (500 mg total) by mouth daily for 1 day, THEN 1 tablet (250 mg total) daily for 4 days.    fluticasone propionate 50 MCG/ACT Nasal Suspension 1 each 2     Si spray by Nasal route daily. One spray per each nostril daily.       Imaging & Referrals:  UROLOGY - INTERNAL

## 2024-03-01 ENCOUNTER — HOSPITAL ENCOUNTER (EMERGENCY)
Facility: HOSPITAL | Age: 44
Discharge: HOME | End: 2024-03-01
Attending: EMERGENCY MEDICINE
Payer: COMMERCIAL

## 2024-03-01 ENCOUNTER — TELEPHONE (OUTPATIENT)
Dept: ORTHOPEDICS CLINIC | Facility: CLINIC | Age: 44
End: 2024-03-01

## 2024-03-01 ENCOUNTER — APPOINTMENT (OUTPATIENT)
Dept: ULTRASOUND IMAGING | Facility: HOSPITAL | Age: 44
End: 2024-03-01
Attending: EMERGENCY MEDICINE
Payer: COMMERCIAL

## 2024-03-01 ENCOUNTER — APPOINTMENT (OUTPATIENT)
Dept: GENERAL RADIOLOGY | Facility: HOSPITAL | Age: 44
End: 2024-03-01
Attending: EMERGENCY MEDICINE
Payer: COMMERCIAL

## 2024-03-01 VITALS
HEART RATE: 78 BPM | HEIGHT: 72 IN | DIASTOLIC BLOOD PRESSURE: 70 MMHG | SYSTOLIC BLOOD PRESSURE: 132 MMHG | WEIGHT: 200 LBS | BODY MASS INDEX: 27.09 KG/M2 | OXYGEN SATURATION: 97 % | RESPIRATION RATE: 18 BRPM | TEMPERATURE: 98 F

## 2024-03-01 DIAGNOSIS — M25.561 ACUTE PAIN OF RIGHT KNEE: Primary | ICD-10-CM

## 2024-03-01 PROCEDURE — 73560 X-RAY EXAM OF KNEE 1 OR 2: CPT | Performed by: EMERGENCY MEDICINE

## 2024-03-01 PROCEDURE — 99284 EMERGENCY DEPT VISIT MOD MDM: CPT

## 2024-03-01 PROCEDURE — 93971 EXTREMITY STUDY: CPT | Performed by: EMERGENCY MEDICINE

## 2024-03-01 RX ORDER — TRAMADOL HYDROCHLORIDE 50 MG/1
50 TABLET ORAL EVERY 6 HOURS PRN
Qty: 10 TABLET | Refills: 0 | Status: SHIPPED | OUTPATIENT
Start: 2024-03-01 | End: 2024-03-08

## 2024-03-01 RX ORDER — KETOROLAC TROMETHAMINE 10 MG/1
10 TABLET, FILM COATED ORAL EVERY 6 HOURS PRN
Qty: 20 TABLET | Refills: 0 | Status: SHIPPED | OUTPATIENT
Start: 2024-03-01 | End: 2024-03-08

## 2024-03-01 NOTE — ED PROVIDER NOTES
Patient Seen in: [facility] Emergency Department    History     Chief Complaint   Patient presents with    Knee Pain       HPI    History is provided by patient/independent historian: patient, patient's wife  43 year old male with history of hypertension, nephrolithiasis, here with complaints of right knee pain over the past week.  Patient has been dealing intermittently with right sharp knee pain radiating into the shin since a remote skiing injury.  He recently returned home from skiing again without any known trauma this time.  He reports intermittent sharp shooting pain from the knee down into the shin.  It drips into the floor and has been becoming more frequent.  Wife was concerned for a blood clot.  No chest pain or shortness of breath.  No swelling.    History reviewed.   Past Medical History:   Diagnosis Date    Essential hypertension     Kidney stone     Myopia     Nephrocalcinosis     Rosacea     Seborrheic dermatitis          History reviewed.   Past Surgical History:   Procedure Laterality Date    EYE SURGERY      LASIK  2012    RECONST CHIN AUGMENT  2015    REPAIR OF NASAL SEPTUM  2008    SKIN SURGERY  14    R inguinal spindle cell lipoma    SKIN SURGERY  2015    Lipoma x 2, upper back, [facility]    STRABISMUS SURGERY  2012         Home Medications reviewed :  (Not in a hospital admission)        History reviewed.   Social History     Socioeconomic History    Marital status:     Number of children: 1   Occupational History    Occupation: Business Owner   Tobacco Use    Smoking status: Former     Packs/day: .5     Types: Cigarettes     Quit date: 2000     Years since quittin.3    Smokeless tobacco: Never   Vaping Use    Vaping Use: Never used   Substance and Sexual Activity    Alcohol use: No    Drug use: Never   Other Topics Concern    Caffeine Concern Yes     Comment: Coffee, Soda         ROS  Review of Systems   Respiratory:  Negative for shortness of breath.     Cardiovascular:  Negative for chest pain.   Musculoskeletal:  Positive for arthralgias.   All other systems reviewed and are negative.     All other pertinent organ systems are reviewed and are negative.      Physical Exam     ED Triage Vitals [03/01/24 1151]   /73   Pulse 76   Resp 15   Temp 98.2 °F (36.8 °C)   Temp src Temporal   SpO2 97 %   O2 Device None (Room air)     Vital signs reviewed.      Physical Exam  Vitals and nursing note reviewed.   Cardiovascular:      Pulses: Normal pulses.   Pulmonary:      Effort: No respiratory distress.   Abdominal:      General: There is no distension.   Musculoskeletal:      Comments: Right knee, mild effusion, no patellar tender nodes, quadriceps and patellar tendons intact, negative Lachman, negative anterior drawer test, right ankle and hip unremarkable   Neurological:      Mental Status: He is alert.         ED Course       Labs:   Labs Reviewed - No data to display      My EKG Interpretation:   As reviewed and Interpreted by me      Imaging Results Available and Reviewed while in ED:   US VENOUS DOPPLER LEG RIGHT - DIAG IMG (CPT=93971)    Result Date: 3/1/2024  CONCLUSION:   No evidence of deep venous thrombosis.     Dictated by (CST): Pio Curtis MD on 3/01/2024 at 2:18 PM     Finalized by (CST): Pio Curtis MD on 3/01/2024 at 2:19 PM          XR KNEE (1 OR 2 VIEWS), RIGHT (CPT=73560)    Result Date: 3/1/2024  CONCLUSION: Normal examination.     Dictated by (CST): Yuriy Knott MD on 3/01/2024 at 1:24 PM     Finalized by (CST): Yuriy Knott MD on 3/01/2024 at 1:25 PM         My review and independent interpretation of XR, US images: no fracture, no DVT. Radiology report corroborates this in addition to other details as reported by them.      Decision rules/scores evaluated: none      Diagnostic labs/tests considered but not ordered: CBC, BMP, ddimer    ED Medications Administered: Medications - No data to display             [facility]       Medical  Decision Making      Differential Diagnosis: After obtaining the patient's history, performing the physical exam and reviewing the diagnostics, multiple initial diagnoses were considered based on the presenting problem including DVT, torn meniscus, sprain, fracture, baker's cyst    External document review: I personally reviewed available external medical records for any recent pertinent discharge summaries, testing, and procedures - the findings are as follows: 2/14/24 visit with Dr. Bailon for sinusitis    Complicating Factors: The patient already  has a past medical history of Essential hypertension, Kidney stone, Myopia, Nephrocalcinosis, Rosacea, and Seborrheic dermatitis. to contribute to the complexity of this ED evaluation.    Procedures performed: none    Discussed management with physician/appropriate source: none    Considered admission/deescalation of care for: none    Social determinants of health affecting patient care: none    Prescription medications considered: tramadol, discussed continuing current medication regimen    The patient requires continuous monitoring for: knee pain    Shared decision making: discussed possible admission        Disposition and Plan     Clinical Impression:  1. Acute pain of right knee        Disposition:  Discharge    Follow-up:  Vick Brand MD  [address] [address]  [phone]    Follow up        Medications Prescribed:  Current Discharge Medication List        START taking these medications    Details   traMADol 50 MG Oral Tab Take 1 tablet (50 mg total) by mouth every 6 (six) hours as needed.  Qty: 10 tablet, Refills: 0    Associated Diagnoses: Acute pain of right knee      Ketorolac Tromethamine 10 MG Oral Tab Take 1 tablet (10 mg total) by mouth every 6 (six) hours as needed.  Qty: 20 tablet, Refills: 0

## 2024-03-01 NOTE — ED QUICK NOTES
Action and side effects of Toradol and Ultram reviewed. Importance of MD follow up reviewed. Pt ambulating by self with steady gait at time of discharge. Pt verbalized understanding.

## 2024-03-01 NOTE — TELEPHONE ENCOUNTER
Patient was at [facility] today due to knee pain. Was told to follow up with AHMET Brand. Patient is in pain. No openings.Please advise

## 2024-03-01 NOTE — TELEPHONE ENCOUNTER
S/w pt and his spouse and she states pt has had severe right knee pain the past week, but today the pain was unbearable and he went to to ER. She states pt had a skiing injury 15 yrs ago. Then 2 months ago he fell on his knee but felt fine after and didn't get evaluated. Pt had normal knee XR today and US was negative for DVT. Appt scheduled 3/6 at 345pm with Dr Li. Address given.

## 2024-03-01 NOTE — ED INITIAL ASSESSMENT (HPI)
Patient with intermittent anterior knee and shin pain x1 week. No recent trauma. Last known trauma approx 2 mos ago while skiing. Patient reports the leg is fine, and then all of a sudden he gets stabbing pain in the area. +limp with ambulation.

## 2024-03-06 ENCOUNTER — OFFICE VISIT (OUTPATIENT)
Dept: ORTHOPEDICS CLINIC | Facility: CLINIC | Age: 44
End: 2024-03-06

## 2024-03-06 DIAGNOSIS — S83.281A TEAR OF LATERAL MENISCUS OF RIGHT KNEE, CURRENT, UNSPECIFIED TEAR TYPE, INITIAL ENCOUNTER: Primary | ICD-10-CM

## 2024-03-06 PROCEDURE — 99244 OFF/OP CNSLTJ NEW/EST MOD 40: CPT | Performed by: ORTHOPAEDIC SURGERY

## 2024-03-08 ENCOUNTER — PATIENT MESSAGE (OUTPATIENT)
Dept: ORTHOPEDICS CLINIC | Facility: CLINIC | Age: 44
End: 2024-03-08

## 2024-03-09 ENCOUNTER — HOSPITAL ENCOUNTER (OUTPATIENT)
Dept: MRI IMAGING | Facility: HOSPITAL | Age: 44
Discharge: HOME | End: 2024-03-09
Attending: ORTHOPAEDIC SURGERY
Payer: COMMERCIAL

## 2024-03-09 DIAGNOSIS — S83.281A TEAR OF LATERAL MENISCUS OF RIGHT KNEE, CURRENT, UNSPECIFIED TEAR TYPE, INITIAL ENCOUNTER: ICD-10-CM

## 2024-03-09 PROCEDURE — 73721 MRI JNT OF LWR EXTRE W/O DYE: CPT | Performed by: ORTHOPAEDIC SURGERY

## 2024-03-11 ENCOUNTER — TELEPHONE (OUTPATIENT)
Dept: CASE MANAGEMENT | Age: 44
End: 2024-03-11

## 2024-03-11 DIAGNOSIS — S83.281A TEAR OF LATERAL MENISCUS OF RIGHT KNEE, CURRENT, UNSPECIFIED TEAR TYPE, INITIAL ENCOUNTER: Primary | ICD-10-CM

## 2024-03-11 NOTE — TELEPHONE ENCOUNTER
Romel Archuleta        Status: DENIED        Reference number MEM ID: [id number]     A copy of the denial letter is filed under the MEDIA tab, reference for complete details. You may reach out to MICHAELA at [phone] to discuss decision.     Please reach out to patient with plan of care.     I spoke with Pio on Friday and advised him the MRI was still pending and he should r/s.  He asked for pain management  (medication) and I advised him to call the office and a nurse would be able to help him with that. He verbalized understanding, and I transferred him to central scheduling to r/s his appt.    He kept his appointment on 3/9/24, and the case has been denied.     Thank you  Carmen BEAVERS

## 2024-03-11 NOTE — TELEPHONE ENCOUNTER
From: Pio Rust  To: ADRI HERRERA  Sent: 3/8/2024 10:04 AM CST  Subject: Pain    Hello after the visit the knee pain has intensified. It happens suddenly, lasts longer than before and again has me rolling on the ground in pain. I’ve been using Tylenol. Can you prescribe something non narcotic for the pain that’s stronger than over the counter? Nothin new to reinsure the knee has occurred. MRI is scheduled within the week.

## 2024-03-11 NOTE — TELEPHONE ENCOUNTER
Easiest course of action is to try 6 weeks of PT, then re-order MRI.  If pt already had MRI, will need to appeal.

## 2024-03-11 NOTE — TELEPHONE ENCOUNTER
Please review below. MRI has been denied and pt was notified, but pt had MRI completed. Will you complete peer to peer?

## 2024-03-11 NOTE — TELEPHONE ENCOUNTER
Called pt and he states the pain from last week has gone down and is more manageable. I advised him he should schedule f/u appt to discuss MRI results and next steps. Apt scheduled on 3/14 at 830am. He had no further concerns.

## 2024-03-12 ENCOUNTER — TELEMEDICINE (OUTPATIENT)
Dept: FAMILY MEDICINE CLINIC | Facility: CLINIC | Age: 44
End: 2024-03-12

## 2024-03-12 DIAGNOSIS — M25.561 ACUTE PAIN OF RIGHT KNEE: ICD-10-CM

## 2024-03-12 DIAGNOSIS — J34.89 RHINORRHEA: ICD-10-CM

## 2024-03-12 PROCEDURE — 99441 PHONE E/M BY PHYS 5-10 MIN: CPT | Performed by: FAMILY MEDICINE

## 2024-03-12 RX ORDER — TRAMADOL HYDROCHLORIDE 50 MG/1
50 TABLET ORAL EVERY 6 HOURS PRN
Qty: 30 TABLET | Refills: 0 | Status: SHIPPED | OUTPATIENT
Start: 2024-03-12 | End: 2024-03-19

## 2024-03-12 NOTE — PROGRESS NOTES
Subjective:   Patient ID: Pio Rust is a 43 year old male.    Virtual Telephone Check-In    Pio Rust verbally consents to a Virtual/Telephone Check-In visit on 03/12/24.  Patient has been referred to the [facility] website at [email or web address] to review the yearly Consent to Treat document.    Patient understands and accepts financial responsibility for any deductible, co-insurance and/or co-pays associated with this service.    Duration of the service: 8 minutes      Summary of topics discussed: runny nose/ knee pain:    Pt presents for follow up from the ER for sig knee pain. Pt had x-ray and ultrasound done which were unremarkable. Patient is being treated with pain mediations and requesting refill on tramadol after discussion. Pt has follow up with orthopedics. Pt had MRI done also which showed some effusion and Baker's cyst.  Pt also has had some runny nose which is much better after abx and nasal steroids. No fevers or sinus pains.          Foster Bailon MD                  History/Other:   Review of Systems  Current Outpatient Medications   Medication Sig Dispense Refill    traMADol 50 MG Oral Tab Take 1 tablet (50 mg total) by mouth every 6 (six) hours as needed. 30 tablet 0    SOOLANTRA 1 % External Cream Apply 1 Application topically daily.      ketoconazole 2 % External Cream APPLY A SMALL AMOUNT TO AFFECTED IMMEDIATE SURROUNDING AREA AT BEDTIME AS DIRECTED      ketoconazole 2 % External Shampoo       Sulfacetamide Sodium-Sulfur 10-5 % External Liquid APPLY SMALL AMOUNT OF CLEANSER TOPICALLY ONCE OR TWICE DAILY FOR SKIN CLEANSING.      Sulfacetamide Sodium-Sulfur 10-5 % External Cream APPLY THICK FILM TOPICALLY OVERNIGHT TO LARGE LESION AND WASH OFF IN THE MORNING AS DIRECTED      Tretinoin 0.05 % External Cream APPLY PEA SIZED AMOUNT IN A THIN LAYER EVENLY TO SKIN ON ENTIRE FACE THREE TIMES PER WEEK      cyclobenzaprine 10 MG Oral Tab Take 1 tablet (10 mg total) by mouth nightly.      doxycycline  100 MG Oral Cap Take 1 capsule (100 mg total) by mouth daily.      fluticasone propionate 50 MCG/ACT Nasal Suspension 1 spray by Nasal route daily. One spray per each nostril daily. 1 each 2    tadalafil 5 MG Oral Tab Take 1 tablet (5 mg total) by mouth daily.      albuterol 108 (90 Base) MCG/ACT Inhalation Aero Soln Inhale 2 puffs into the lungs every 4 (four) hours as needed for Wheezing. 1 each 0    lisinopril 10 MG Oral Tab Take 1 tablet (10 mg total) by mouth daily. 90 tablet 0    finasteride 1 mg/mL Oral Solution Take 5 mL (5 mg total) by mouth daily.       Allergies:  Allergies   Allergen Reactions    Penicillins UNKNOWN and OTHER (SEE COMMENTS)     CHILDHOOD  Reaction unknown to patient; occurred during childhood    Reaction unknown to patient; occurred during childhood       Objective:   Physical Exam  Neurological:      Mental Status: He is alert.   Psychiatric:         Mood and Affect: Mood normal.         Behavior: Behavior normal.         Assessment & Plan:   1. Acute pain of right knee: reviewed MRI and ER visit:  - After discussion with patient, to monitor for symptoms and call if any significant symptoms; tramadol as needed for pain/ refilled: to follow up with orthopedics for further treatment     2. Rhinorrhea: improved:  - Stable: medication reviewed and can use flonase as needed; follow up as needed.         No orders of the defined types were placed in this encounter.      Meds This Visit:  Requested Prescriptions     Signed Prescriptions Disp Refills    traMADol 50 MG Oral Tab 30 tablet 0     Sig: Take 1 tablet (50 mg total) by mouth every 6 (six) hours as needed.       Imaging & Referrals:  None

## 2024-05-22 DIAGNOSIS — M25.561 ACUTE PAIN OF RIGHT KNEE: ICD-10-CM

## 2024-05-27 NOTE — TELEPHONE ENCOUNTER
Please review; protocol failed/No Protocol    Recent Fills: 03/01/2024, 03/12/2024    Last Rx Written: 03/12/2024    Last Office Visit: 02/14/2024    Requested Prescriptions   Pending Prescriptions Disp Refills    TRAMADOL 50 MG Oral Tab [Pharmacy Med Name: TRAMADOL 50MG TABLETS] 30 tablet 0     Sig: TAKE 1 TABLET(50 MG) BY MOUTH EVERY 6 HOURS AS NEEDED       Controlled Substance Medication Failed - 5/22/2024  6:15 PM        Failed - This medication is a controlled substance - forward to provider to refill             Recent Outpatient Visits              2 months ago Acute pain of right knee    [facility], Foster Hernandez MD    Telemedicine    2 months ago Tear of lateral meniscus of right knee, current, unspecified tear type, initial encounter    [facility], Reece Kline [facility]    Office Visit    3 months ago Acute sinusitis, recurrence not specified, [facility]    [facility]    5 months ago Subacute cough    [facility]    Office Visit    5 months ago Bronchitis    [facility] Roldan Baires Jr[facility] AP[facility]    Office Visit

## 2024-05-28 RX ORDER — TRAMADOL HYDROCHLORIDE 50 MG/1
50 TABLET ORAL EVERY 6 HOURS PRN
Qty: 30 TABLET | Refills: 0 | Status: SHIPPED | OUTPATIENT
Start: 2024-05-28

## 2024-05-28 NOTE — TELEPHONE ENCOUNTER
Message noted: Chart reviewed and may refill medication as requested. Script sent to listed pharmacy by secure method.    Pt notified through [employer]

## 2024-07-29 DIAGNOSIS — M25.561 ACUTE PAIN OF RIGHT KNEE: ICD-10-CM

## 2024-08-01 RX ORDER — TRAMADOL HYDROCHLORIDE 50 MG/1
50 TABLET ORAL EVERY 6 HOURS PRN
Qty: 30 TABLET | Refills: 0 | Status: SHIPPED | OUTPATIENT
Start: 2024-08-01

## 2024-08-01 NOTE — TELEPHONE ENCOUNTER
Please review.  Protocol failed / Has no protocol.    *Written for acute knee pain 3/12/24 - is refill appropriate?    Tramadol Recent fills : 3/12, 5/28  Last prescription written: 5/28 #30  Last office visit: 2/14/24       Requested Prescriptions   Pending Prescriptions Disp Refills    TRAMADOL 50 MG Oral Tab [Pharmacy Med Name: TRAMADOL 50MG TABLETS] 30 tablet 0     Sig: TAKE 1 TABLET(50 MG) BY MOUTH EVERY 6 HOURS AS NEEDED       Controlled Substance Medication Failed - 7/29/2024  3:55 AM        Failed - This medication is a controlled substance - forward to provider to refill             Recent Outpatient Visits              4 months ago Acute pain of right knee    [facility], Foster Hernandez MD    Telemedicine    4 months ago Tear of lateral meniscus of right knee, current, unspecified tear type, initial encounter    [facility], Reece Kline MD    Office Visit    5 months ago Acute sinusitis, recurrence not specified, unspecified location    [facility], Foster Hernandez MD    Office Visit    7 months ago Subacute cough    [facility] Rad[facility]    Office Visit    7 months ago Bronchitis    [facility], Roldan Baires Jr., APN    Office Visit

## 2024-08-01 NOTE — TELEPHONE ENCOUNTER
Message noted: Chart reviewed and may refill medication as requested. Script sent to listed pharmacy by secure method.    Pt notified through [employer]

## 2024-10-02 DIAGNOSIS — M25.561 ACUTE PAIN OF RIGHT KNEE: ICD-10-CM

## 2024-10-03 RX ORDER — TRAMADOL HYDROCHLORIDE 50 MG/1
50 TABLET ORAL EVERY 6 HOURS PRN
Qty: 30 TABLET | Refills: 0 | Status: SHIPPED | OUTPATIENT
Start: 2024-10-03

## 2024-10-03 NOTE — TELEPHONE ENCOUNTER
Message noted: Chart reviewed and may refill medication as requested. Script sent to listed pharmacy by secure method.    Pt notified through [employer]

## 2024-12-09 DIAGNOSIS — M25.561 ACUTE PAIN OF RIGHT KNEE: ICD-10-CM

## 2024-12-10 RX ORDER — TRAMADOL HYDROCHLORIDE 50 MG/1
50 TABLET ORAL EVERY 6 HOURS PRN
Qty: 30 TABLET | Refills: 0 | Status: SHIPPED | OUTPATIENT
Start: 2024-12-10

## 2024-12-10 NOTE — TELEPHONE ENCOUNTER
Message noted: Chart reviewed and may refill medication as requested. Script sent to listed pharmacy by secure method.    Pt notified through [email or web address]

## 2025-01-04 RX ORDER — LISINOPRIL 10 MG/1
10 TABLET ORAL DAILY
Qty: 90 TABLET | Refills: 0 | Status: SHIPPED | OUTPATIENT
Start: 2025-01-04 | End: 2025-04-01

## 2025-01-04 NOTE — TELEPHONE ENCOUNTER
Message noted: Chart reviewed and may refill medication as requested times one. Prescription sent to listed pharmacy. Pharmacy to notify patient to make appointment for further refills  Pt notified through [employer] also.  Should get finasteride from specialist prescribing this.

## 2025-03-14 DIAGNOSIS — M25.561 ACUTE PAIN OF RIGHT KNEE: ICD-10-CM

## 2025-03-14 RX ORDER — TRAMADOL HYDROCHLORIDE 50 MG/1
50 TABLET ORAL EVERY 6 HOURS PRN
Qty: 30 TABLET | Refills: 0 | Status: SHIPPED | OUTPATIENT
Start: 2025-03-14

## 2025-03-14 NOTE — TELEPHONE ENCOUNTER
Message noted: Chart reviewed and may refill medication as requested. Script sent to listed pharmacy by secure method.    Pt notified through [employer]

## 2025-04-01 RX ORDER — LISINOPRIL 10 MG/1
10 TABLET ORAL DAILY
Qty: 90 TABLET | Refills: 0 | Status: SHIPPED | OUTPATIENT
Start: 2025-04-01

## 2025-04-01 NOTE — TELEPHONE ENCOUNTER
Message noted: Chart reviewed and may refill medication as requested times one. Prescription sent to listed pharmacy. Pharmacy to notify patient to make appointment for further refills  Pt notified through [employer] also.

## 2025-07-01 RX ORDER — LISINOPRIL 10 MG/1
10 TABLET ORAL DAILY
Qty: 90 TABLET | Refills: 0 | Status: SHIPPED | OUTPATIENT
Start: 2025-07-01

## 2025-07-01 NOTE — TELEPHONE ENCOUNTER
Message noted: Chart reviewed and may refill medication as requested times one. Prescription sent to listed pharmacy. Pharmacy to notify patient to make appointment for further refills  Pt notified through [employer] also.

## 2025-07-28 ENCOUNTER — OFFICE VISIT (OUTPATIENT)
Dept: FAMILY MEDICINE CLINIC | Facility: CLINIC | Age: 45
End: 2025-07-28
Payer: COMMERCIAL

## 2025-07-28 VITALS — BODY MASS INDEX: 26.55 KG/M2 | TEMPERATURE: 98 F | WEIGHT: 196 LBS | HEIGHT: 72 IN

## 2025-07-28 DIAGNOSIS — Z12.11 COLON CANCER SCREENING: Primary | ICD-10-CM

## 2025-07-28 RX ORDER — TADALAFIL 5 MG/1
5 TABLET ORAL DAILY
Refills: 0 | Status: CANCELLED | OUTPATIENT
Start: 2025-07-28

## 2025-07-28 NOTE — PROGRESS NOTES
Subjective:   Patient ID: Pio Rust is a 44 year old male.    Pt presents to discuss refills for a medication he was getting at a [facility] clinic for urinary frequency/ nocturia. After discussion, pt will contact them for refills. Pt has had blood done at outside facility and will provide results.   Also reviewed colon screening and answered questions about routine health screenings. Discussed heart scan and options for colon screening.         History/Other:   Review of Systems  Current Medications[1]  Allergies:Allergies[2]    Objective:   Physical Exam  Constitutional:       Appearance: Normal appearance.   Neurological:      Mental Status: He is alert.   Psychiatric:         Mood and Affect: Mood normal.         Behavior: Behavior normal.         Thought Content: Thought content normal.         Judgment: Judgment normal.         Assessment & Plan:   1. Colon cancer screening:  - After discussion, FIT testing was ordered, Follow up and further management after testing; also answered questions for heart scan and information provided. Pt will provide results of recent lab work       Orders Placed This Encounter   Procedures    Occult Blood, Fecal, FIT Immunoassay       Meds This Visit:  Requested Prescriptions      No prescriptions requested or ordered in this encounter       Imaging & Referrals:  OP REFERRAL TO [facility] GI TELEPHONE COLON SCREEN         [1]   Current Outpatient Medications   Medication Sig Dispense Refill    LISINOPRIL 10 MG Oral Tab TAKE 1 TABLET(10 MG) BY MOUTH DAILY 90 tablet 0    SOOLANTRA 1 % External Cream Apply 1 Application topically daily.      ketoconazole 2 % External Cream APPLY A SMALL AMOUNT TO AFFECTED IMMEDIATE SURROUNDING AREA AT BEDTIME AS DIRECTED      ketoconazole 2 % External Shampoo       Sulfacetamide Sodium-Sulfur 10-5 % External Liquid APPLY SMALL AMOUNT OF CLEANSER TOPICALLY ONCE OR TWICE DAILY FOR SKIN CLEANSING.      Sulfacetamide Sodium-Sulfur 10-5 % External Cream  APPLY THICK FILM TOPICALLY OVERNIGHT TO LARGE LESION AND WASH OFF IN THE MORNING AS DIRECTED      cyclobenzaprine 10 MG Oral Tab Take 1 tablet (10 mg total) by mouth nightly.      fluticasone propionate 50 MCG/ACT Nasal Suspension 1 spray by Nasal route daily. One spray per each nostril daily. 1 each 2    TRAMADOL 50 MG Oral Tab TAKE 1 TABLET(50 MG) BY MOUTH EVERY 6 HOURS AS NEEDED 30 tablet 0    Tretinoin 0.05 % External Cream APPLY PEA SIZED AMOUNT IN A THIN LAYER EVENLY TO SKIN ON ENTIRE FACE THREE TIMES PER WEEK      doxycycline 100 MG Oral Cap Take 1 capsule (100 mg total) by mouth daily. (Patient not taking: Reported on 7/28/2025)      tadalafil 5 MG Oral Tab Take 1 tablet (5 mg total) by mouth daily.      albuterol 108 (90 Base) MCG/ACT Inhalation Aero Soln Inhale 2 puffs into the lungs every 4 (four) hours as needed for Wheezing. (Patient not taking: Reported on 7/28/2025) 1 each 0    finasteride 1 mg/mL Oral Solution Take 5 mL (5 mg total) by mouth daily. (Patient not taking: Reported on 7/28/2025)     [2]   Allergies  Allergen Reactions    Penicillins UNKNOWN and OTHER (SEE COMMENTS)     CHILDHOOD  Reaction unknown to patient; occurred during childhood    Reaction unknown to patient; occurred during childhood

## (undated) NOTE — ED AVS SNAPSHOT
Nisreen Stewart   MRN: [id number]    Department:  [facility] Emergency Department   Date of Visit:  11/16/2017           Disclosure     Insurance plans vary and the physician(s) referred by the ER may not be covered by your plan.  Please contact your CARE PHYSICIAN AT ONCE OR RETURN IMMEDIATELY TO THE EMERGENCY DEPARTMENT. If you have been prescribed any medication(s), please fill your prescription right away and begin taking the medication(s) as directed.   If you believe that any of the medications

## (undated) NOTE — MR AVS SNAPSHOT
[facility] [place] [address]  [address] [phone]               Thank you for choosing us for your health care visit with Florentin Salter MD.  We are glad to serve you and happy to provide you with this summary of y Order:  Urology - Internal    Tali Olsen MD   [facility]   [address] [facility] [address] [address]   Phone:  [phone]   Fax:  [phone]         Referral Orders      Normal Orders This Visit    UROLOGY - INTERNAL [[id number] CUSTOM]  Order #:  1 Now link in the [employer] Intake. Enter your iLink Activation Code exactly as it appears below along with your Zip Code and Date of Birth to complete the sign-up process. If you do not sign up before the expiration date, you must request a new code.     Adeline Yeager

## (undated) NOTE — MR AVS SNAPSHOT
[facility] [place] [address] [address] [id number] [phone]               Thank you for choosing us for your health care visit with Rachael Canela MD.  We are glad to serve you and happy to provide you with this summary of y testing room. Parents will remain in the MRI waiting area and be reunited with the child upon completion of the MRI. Today's Orders     Comp Metabolic Panel (14) [E]    Complete by:   Mar 28, 2017 (Approximate)    Assoc Dx:  Routine physical ex [employer] [facility] [address] [place], [place] [address] for making healthy food choices  -   Enjoy your food, but eat less. Fully enjoy your food when eating. Don’t eat while distracted and slow down. Avoid over sized portions. Don’t eat while when you’re bored.

## (undated) NOTE — LETTER
12/26/2017              Landon Pardo        [facility] [address]         Dear Letty Haq,    It was a pleasure to see you. Your analysis  was normal.  There is no need for further testing at this time.   I look forward to seeing you at yo